# Patient Record
Sex: MALE | Race: WHITE | NOT HISPANIC OR LATINO | Employment: FULL TIME | URBAN - NONMETROPOLITAN AREA
[De-identification: names, ages, dates, MRNs, and addresses within clinical notes are randomized per-mention and may not be internally consistent; named-entity substitution may affect disease eponyms.]

---

## 2022-04-25 ENCOUNTER — HOSPITAL ENCOUNTER (OUTPATIENT)
Facility: MEDICAL CENTER | Age: 42
End: 2022-04-25
Attending: NURSE PRACTITIONER
Payer: COMMERCIAL

## 2022-04-25 ENCOUNTER — OFFICE VISIT (OUTPATIENT)
Dept: URGENT CARE | Facility: PHYSICIAN GROUP | Age: 42
End: 2022-04-25
Payer: COMMERCIAL

## 2022-04-25 VITALS
OXYGEN SATURATION: 97 % | RESPIRATION RATE: 14 BRPM | SYSTOLIC BLOOD PRESSURE: 124 MMHG | DIASTOLIC BLOOD PRESSURE: 88 MMHG | HEIGHT: 69 IN | TEMPERATURE: 98.9 F | WEIGHT: 260 LBS | BODY MASS INDEX: 38.51 KG/M2 | HEART RATE: 92 BPM

## 2022-04-25 DIAGNOSIS — J02.9 SORETHROAT: ICD-10-CM

## 2022-04-25 DIAGNOSIS — J03.01 RECURRENT STREPTOCOCCAL TONSILLITIS: ICD-10-CM

## 2022-04-25 DIAGNOSIS — J34.89 SINUS PRESSURE: ICD-10-CM

## 2022-04-25 DIAGNOSIS — R09.81 NASAL CONGESTION: ICD-10-CM

## 2022-04-25 DIAGNOSIS — R51.9 SINUS HEADACHE: ICD-10-CM

## 2022-04-25 LAB
FLUAV+FLUBV AG SPEC QL IA: NEGATIVE
INT CON NEG: NORMAL
INT CON POS: NORMAL

## 2022-04-25 PROCEDURE — 99203 OFFICE O/P NEW LOW 30 MIN: CPT | Performed by: NURSE PRACTITIONER

## 2022-04-25 PROCEDURE — 87804 INFLUENZA ASSAY W/OPTIC: CPT | Performed by: NURSE PRACTITIONER

## 2022-04-25 PROCEDURE — 87070 CULTURE OTHR SPECIMN AEROBIC: CPT

## 2022-04-25 RX ORDER — FLUTICASONE PROPIONATE 50 MCG
1 SPRAY, SUSPENSION (ML) NASAL DAILY
COMMUNITY

## 2022-04-25 RX ORDER — HYDROXYZINE 50 MG/1
TABLET, FILM COATED ORAL
COMMUNITY
Start: 2022-01-31

## 2022-04-25 RX ORDER — AMOXICILLIN 875 MG/1
875 TABLET, COATED ORAL 2 TIMES DAILY
Qty: 14 TABLET | Refills: 0 | Status: SHIPPED | OUTPATIENT
Start: 2022-04-25 | End: 2022-05-02

## 2022-04-25 RX ORDER — ALBUTEROL SULFATE 90 UG/1
AEROSOL, METERED RESPIRATORY (INHALATION)
COMMUNITY

## 2022-04-26 DIAGNOSIS — J34.89 SINUS PRESSURE: ICD-10-CM

## 2022-04-26 DIAGNOSIS — R09.81 NASAL CONGESTION: ICD-10-CM

## 2022-04-26 DIAGNOSIS — J02.9 SORETHROAT: ICD-10-CM

## 2022-04-26 DIAGNOSIS — R51.9 SINUS HEADACHE: ICD-10-CM

## 2022-04-26 DIAGNOSIS — J03.01 RECURRENT STREPTOCOCCAL TONSILLITIS: ICD-10-CM

## 2022-04-26 NOTE — PROGRESS NOTES
"Subjective:   Markell Mesa is a 41 y.o. male who presents for Sinus Pain and Pharyngitis       [facility]  Patient presents for evaluation of a 4 day history of sinus pressure and pain, headache, nasal congestion, and sore throat.  Patient has a history of recurrent strep pharyngitis, and states this feels the same.  Has tried over-the-counter Tylenol Advil without relief of his symptoms.  Patient is currently [place] here [place], is unsure of any known ill contacts or exposures.    ROS  All other systems are negative except as documented above within HPI.    MEDS:   Current Outpatient Medications:   •  fluticasone-salmeterol (ADVAIR DISKUS) 250-50 MCG/DOSE AEROSOL POWDER, BREATH ACTIVATED, INHALE 1 PUFF BY MOUTH TWICE A DAY RINSE MOUTH WITH WATER AFTER USE, Disp: , Rfl:   •  hydrOXYzine HCl (ATARAX) 50 MG Tab, TAKE 1 TABLET BY MOUTH EVERY DAY NIGHTLY AS NEEDED FOR SLEEP, Disp: , Rfl:   •  fluticasone (FLONASE) 50 MCG/ACT nasal spray, Administer 1 Spray into affected nostril(S) every day., Disp: , Rfl:   •  albuterol (PROAIR HFA) 108 (90 Base) MCG/ACT Aero Soln inhalation aerosol, 2 puffs as needed, Disp: , Rfl:   ALLERGIES: No Known Allergies    Patient's PMH, SocHx, SurgHx, FamHx, Drug allergies and medications were reviewed.     Objective:   /88   Pulse 92   Temp 37.2 °C (98.9 °F)   Resp 14   Ht 1.753 m (5' 9\")   Wt 118 kg (260 lb)   SpO2 97%   BMI 38.40 kg/m²     Physical Exam  Vitals and nursing note reviewed.   Constitutional:       General: He is awake.      Appearance: Normal appearance. He is well-developed.   HENT:      Head: Normocephalic and atraumatic.      Right Ear: Tympanic membrane, ear canal and external ear normal.      Left Ear: Tympanic membrane, ear canal and external ear normal.      Nose: Congestion and rhinorrhea present.      Right Turbinates: Enlarged.      Left Turbinates: Enlarged.      Right Sinus: Frontal sinus tenderness present.      Left Sinus: Frontal sinus " tenderness present.      Mouth/Throat:      Lips: Pink.      Mouth: Mucous membranes are moist.      Pharynx: Oropharynx is clear. Uvula midline. Posterior oropharyngeal erythema present. No oropharyngeal exudate.      Tonsils: No tonsillar exudate or tonsillar abscesses.   Eyes:      Extraocular Movements: Extraocular movements intact.      Conjunctiva/sclera: Conjunctivae normal.   Neck:      Trachea: Trachea and phonation normal.   Cardiovascular:      Rate and Rhythm: Normal rate and regular rhythm.      Pulses: Normal pulses.      Heart sounds: Normal heart sounds.   Pulmonary:      Effort: Pulmonary effort is normal.      Breath sounds: Normal breath sounds and air entry. No stridor. No wheezing, rhonchi or rales.   Musculoskeletal:         General: Normal range of motion.      Cervical back: Normal range of motion and neck supple.   Lymphadenopathy:      Head:      Right side of head: Tonsillar adenopathy present.      Left side of head: Tonsillar adenopathy present.   Skin:     General: Skin is warm and dry.      Capillary Refill: Capillary refill takes less than 2 seconds.   Neurological:      Mental Status: He is alert and oriented to person, place, and time.   Psychiatric:         Mood and Affect: Mood normal.         Behavior: Behavior is cooperative.         Thought Content: Thought content normal.         Assessment/Plan:   Assessment    1. Recurrent streptococcal tonsillitis  - amoxicillin (AMOXIL) 875 MG tablet; Take 1 Tablet by mouth 2 times a day for 7 days.  Dispense: 14 Tablet; Refill: 0  - CULTURE THROAT; Future    2. Sinus headache  - CULTURE THROAT; Future  - POCT Influenza A/B- negative    3. Sinus pressure  - CULTURE THROAT; Future  - POCT Influenza A/B    4. Nasal congestion  - CULTURE THROAT; Future  - POCT Influenza A/B    5. Sorethroat  - CULTURE THROAT; Future  - POCT Influenza A/B      Vital signs stable at today's acute urgent care visit.  Reviewed test results that were completed in  the clinic.  Begin medications as listed. Discussed management options as indicated.    Advised the patient to follow-up with the primary care provider for recheck, reevaluation, and/or consideration of further management. Return to urgent care with any worsening/continued symptoms.  Red flags discussed and indications to immediately call 911 or present to the ED.  All questions were encouraged and answered to the patient's satisfaction and understanding, and they agree to the plan of care.     I personally reviewed prior external notes and test results pertinent to today's visit.  I have independently reviewed and interpreted all diagnostics ordered during this urgent care acute visit. Time spent evaluating this patient was a minimum of 30 minutes and includes preparing for visit, counseling/education, exam, evaluation, obtaining history, and ordering lab/test/procedures.      Please note that this dictation was created using voice recognition software. I have made a reasonable attempt to correct obvious errors, but I expect that there are errors of grammar and possibly content that I did not discover before finalizing the note.

## 2022-04-28 LAB
BACTERIA SPEC RESP CULT: NORMAL
SIGNIFICANT IND 70042: NORMAL
SITE SITE: NORMAL
SOURCE SOURCE: NORMAL

## 2022-04-29 PROBLEM — G47.39 OTHER SLEEP APNEA: Status: ACTIVE | Noted: 2020-03-10

## 2022-04-29 PROBLEM — E78.1 HYPERTRIGLYCERIDEMIA: Status: ACTIVE | Noted: 2017-09-11

## 2022-04-29 PROBLEM — B00.1 HERPES SIMPLEX LABIALIS: Status: ACTIVE | Noted: 2020-03-10

## 2022-04-29 PROBLEM — L73.2 HIDRADENITIS SUPPURATIVA: Status: ACTIVE | Noted: 2017-08-29

## 2022-04-29 PROBLEM — F51.04 PSYCHOPHYSIOLOGICAL INSOMNIA: Status: ACTIVE | Noted: 2021-05-24

## 2022-04-29 PROBLEM — M54.2 CHRONIC NECK PAIN: Status: ACTIVE | Noted: 2018-11-12

## 2022-04-29 PROBLEM — J45.20 MILD INTERMITTENT ASTHMA WITHOUT COMPLICATION: Status: ACTIVE | Noted: 2017-08-16

## 2022-04-29 PROBLEM — R74.8 ELEVATED LIVER ENZYMES: Status: ACTIVE | Noted: 2020-03-10

## 2022-04-29 PROBLEM — E66.9 OBESITY (BMI 30-39.9): Status: ACTIVE | Noted: 2020-02-16

## 2022-04-29 PROBLEM — F17.210 CIGARETTE SMOKER: Status: ACTIVE | Noted: 2017-09-11

## 2022-04-29 PROBLEM — G89.29 CHRONIC NECK PAIN: Status: ACTIVE | Noted: 2018-11-12

## 2022-04-29 RX ORDER — PHENOL 1.4 %
150 AEROSOL, SPRAY (ML) MUCOUS MEMBRANE NIGHTLY
COMMUNITY

## 2022-04-29 RX ORDER — MONTELUKAST SODIUM 10 MG/1
TABLET ORAL
COMMUNITY

## 2022-04-29 RX ORDER — FENOFIBRATE 160 MG/1
160 TABLET ORAL DAILY
COMMUNITY
Start: 2021-05-03 | End: 2022-05-03